# Patient Record
Sex: MALE | Race: WHITE | NOT HISPANIC OR LATINO | Employment: FULL TIME | URBAN - METROPOLITAN AREA
[De-identification: names, ages, dates, MRNs, and addresses within clinical notes are randomized per-mention and may not be internally consistent; named-entity substitution may affect disease eponyms.]

---

## 2020-04-02 ENCOUNTER — OFFICE VISIT (OUTPATIENT)
Dept: URGENT CARE | Facility: CLINIC | Age: 40
End: 2020-04-02
Payer: COMMERCIAL

## 2020-04-02 VITALS
HEART RATE: 88 BPM | HEIGHT: 60 IN | BODY MASS INDEX: 49.28 KG/M2 | OXYGEN SATURATION: 99 % | WEIGHT: 251 LBS | TEMPERATURE: 98 F | RESPIRATION RATE: 16 BRPM

## 2020-04-02 DIAGNOSIS — I10 HYPERTENSION, UNSPECIFIED TYPE: Primary | ICD-10-CM

## 2020-04-02 PROCEDURE — 99203 PR OFFICE/OUTPT VISIT, NEW, LEVL III, 30-44 MIN: ICD-10-PCS | Mod: S$GLB,,, | Performed by: FAMILY MEDICINE

## 2020-04-02 PROCEDURE — 99203 OFFICE O/P NEW LOW 30 MIN: CPT | Mod: S$GLB,,, | Performed by: FAMILY MEDICINE

## 2020-04-02 RX ORDER — LISINOPRIL 10 MG/1
10 TABLET ORAL DAILY
COMMUNITY
End: 2020-10-09 | Stop reason: SDUPTHER

## 2020-04-02 RX ORDER — LISINOPRIL 20 MG/1
20 TABLET ORAL DAILY
Qty: 90 TABLET | Refills: 0 | Status: SHIPPED | OUTPATIENT
Start: 2020-04-02 | End: 2020-10-09

## 2020-04-02 NOTE — PROGRESS NOTES
Subjective:       Patient ID: Andrew Kinney is a 40 y.o. male.    Vitals:  height is 5' (1.524 m) and weight is 113.9 kg (251 lb). His oral temperature is 97.8 °F (36.6 °C). His pulse is 88. His respiration is 16 and oxygen saturation is 99%.     Chief Complaint: Medication Refill    Pt here for med refill, Lisinopril 10mg    Medication Refill   This is a new problem. The current episode started today. Pertinent negatives include no arthralgias, chest pain, chills, congestion, coughing, fatigue, fever, headaches, joint swelling, myalgias, nausea, rash, sore throat, vertigo or vomiting. Nothing aggravates the symptoms. He has tried nothing for the symptoms. The treatment provided no relief.       Constitution: Negative for chills, fatigue and fever.   HENT: Negative for congestion and sore throat.    Neck: Negative for painful lymph nodes.   Cardiovascular: Negative for chest pain and leg swelling.   Eyes: Negative for double vision and blurred vision.   Respiratory: Negative for cough and shortness of breath.    Gastrointestinal: Negative for nausea, vomiting and diarrhea.   Genitourinary: Negative for dysuria, frequency and urgency.   Musculoskeletal: Negative for joint pain, joint swelling, muscle cramps and muscle ache.   Skin: Negative for color change, pale and rash.   Allergic/Immunologic: Negative for seasonal allergies.   Neurological: Negative for dizziness, history of vertigo, light-headedness, passing out and headaches.   Hematologic/Lymphatic: Negative for swollen lymph nodes, easy bruising/bleeding and history of blood clots. Does not bruise/bleed easily.   Psychiatric/Behavioral: Negative for nervous/anxious, sleep disturbance and depression. The patient is not nervous/anxious.        Objective:      Physical Exam      Patient was seen remotely due to State of Emergency for the COVID-19 outbreak.    Assessment:       1. Hypertension, unspecified type        Plan:     pt needs refill. States  BP is doing well.     Hypertension, unspecified type    Other orders  -     lisinopriL (PRINIVIL,ZESTRIL) 20 MG tablet; Take 1 tablet (20 mg total) by mouth once daily.  Dispense: 90 tablet; Refill: 0

## 2021-04-09 PROBLEM — I10 ESSENTIAL HYPERTENSION: Status: ACTIVE | Noted: 2021-04-09

## 2021-04-29 ENCOUNTER — PATIENT MESSAGE (OUTPATIENT)
Dept: RESEARCH | Facility: HOSPITAL | Age: 41
End: 2021-04-29

## 2023-04-21 PROBLEM — E78.5 HYPERLIPIDEMIA: Status: ACTIVE | Noted: 2023-04-21

## 2024-08-29 ENCOUNTER — OFFICE VISIT (OUTPATIENT)
Dept: GASTROENTEROLOGY | Facility: CLINIC | Age: 44
End: 2024-08-29

## 2024-08-29 VITALS — BODY MASS INDEX: 43.24 KG/M2 | HEIGHT: 65 IN | WEIGHT: 259.5 LBS

## 2024-08-29 DIAGNOSIS — R74.8 ELEVATED LIPASE: ICD-10-CM

## 2024-08-29 DIAGNOSIS — K86.89 PANCREATIC CALCIFICATION: ICD-10-CM

## 2024-08-29 DIAGNOSIS — R19.5 LOOSE STOOLS: ICD-10-CM

## 2024-08-29 DIAGNOSIS — R10.32 LLQ PAIN: Primary | ICD-10-CM

## 2024-08-29 PROCEDURE — 99213 OFFICE O/P EST LOW 20 MIN: CPT | Mod: PBBFAC,PO

## 2024-08-29 PROCEDURE — 99999 PR PBB SHADOW E&M-EST. PATIENT-LVL III: CPT | Mod: PBBFAC,,,

## 2024-08-29 NOTE — PROGRESS NOTES
"Subjective:       Patient ID: Andrew Kinney is a 44 y.o. male Body mass index is 43.18 kg/m².    Chief Complaint: Abdominal Pain    This patient is new to me.  Referring Provider: Dr. Bill Haines for LLQ pain.     Reviewed hospital ER report from 08/25/24,  Medical Decision Making:   Initial Assessment:   44-year-old non-toxic appearing male with no significant past medical history presents to the ED with complaints of left lower quadrant abdominal pain since Friday night.  He denies fever, chills, dysuria, nausea, vomiting, or diarrhea.  Last bowel movement this morning.  No relief with Tylenol last night.  Differential Diagnosis:   Includes but is not limited to diverticulitis, pancreatitis, UTI, dehydration, appendicitis, cholecystitis"    Abdominal Pain  This is a new problem. The current episode started in the past 7 days (Started Friday evening). The onset quality is gradual. The problem occurs intermittently. The problem has been gradually improving (Improved since starting antibiotics). The pain is located in the LLQ. The pain is at a severity of 3/10. The pain is mild. The quality of the pain is sharp (Tender). The abdominal pain radiates to the suprapubic region and RLQ. Associated symptoms include diarrhea (Chronic loose stools; typically has 2-3 BMs daily rated stool 5-6 on [place] scale) and weight loss (Patient was intentionally trying to lose weight). Pertinent negatives include no belching, constipation, dysuria, fever, flatus, frequency, hematochezia, hematuria, melena, nausea or vomiting. The pain is aggravated by movement and palpation. The pain is relieved by Being still. He has tried antibiotics and acetaminophen (Currently taking Augmentin; past treatments: Tylenol (ineffective)) for the symptoms. The treatment provided significant relief. Prior diagnostic workup includes GI consult and CT scan. His past medical history is significant for pancreatitis (possible episode of " pancreatitis 12 years ago after experiencing RUQ pain but did not go to ER because of insurance coverage issues; calcified focus in the mid pancreatic body measuring 1.4 cm likely from previous pancreatitis seen on CT 08/25/2024). There is no history of abdominal surgery, colon cancer, Crohn's disease, gallstones, GERD, irritable bowel syndrome, PUD or ulcerative colitis. Patient's medical history does not include kidney stones and UTI.     Review of Systems   Constitutional:  Positive for weight loss (Patient was intentionally trying to lose weight). Negative for activity change, appetite change, chills, diaphoresis, fatigue, fever and unexpected weight change.   HENT:  Negative for sore throat and trouble swallowing.    Respiratory:  Negative for cough, choking and shortness of breath.    Cardiovascular:  Negative for chest pain.   Gastrointestinal:  Positive for abdominal pain and diarrhea (Chronic loose stools; typically has 2-3 BMs daily rated stool 5-6 on [place] scale). Negative for abdominal distention, anal bleeding, blood in stool, constipation, flatus, hematochezia, melena, nausea, rectal pain and vomiting.   Genitourinary:  Negative for dysuria, frequency and hematuria.       No LMP for male patient.  Past Medical History:   Diagnosis Date    Hypertension      History reviewed. No pertinent surgical history.  Family History   Problem Relation Name Age of Onset    Stroke Mother      Stomach cancer Father      Cancer Father          lung    Lung cancer Father          mets all over body    Seizures Brother          due to Motor vehicle accident    Diabetes Maternal Grandmother      Dementia Maternal Grandmother      No Known Problems Maternal Grandfather      Alzheimer's disease Paternal Grandfather      Esophageal cancer Neg Hx      Crohn's disease Neg Hx      Ulcerative colitis Neg Hx       Social History     Tobacco Use    Smoking status: Former     Current packs/day: 0.00     Types: Cigarettes      Start date:      Quit date:      Years since quittin.6    Smokeless tobacco: Never    Tobacco comments:     quit vaping on 2020    Substance Use Topics    Alcohol use: Not Currently     Wt Readings from Last 10 Encounters:   24 117.7 kg (259 lb 7.7 oz)   24 113.7 kg (250 lb 10.6 oz)   24 122.5 kg (270 lb)   10/23/23 113.4 kg (250 lb)   23 114.5 kg (252 lb 8 oz)   23 122.7 kg (270 lb 8 oz)   23 122.7 kg (270 lb 8 oz)   23 122.7 kg (270 lb 8 oz)   10/21/22 121.8 kg (268 lb 9.6 oz)   22 124.2 kg (273 lb 12.8 oz)     Lab Results   Component Value Date    WBC 8.08 2024    HGB 16.6 2024    HCT 48.8 2024    MCV 90 2024     2024     CMP  Sodium   Date Value Ref Range Status   2024 136 136 - 145 mmol/L Final     Potassium   Date Value Ref Range Status   2024 4.4 3.5 - 5.1 mmol/L Final     Comment:     Anion Gap reference range revised on 2023     Chloride   Date Value Ref Range Status   2024 106 95 - 110 mmol/L Final     CO2   Date Value Ref Range Status   2024 19 (L) 22 - 31 mmol/L Final     Glucose   Date Value Ref Range Status   2024 133 (H) 70 - 110 mg/dL Final     Comment:     The ADA recommends the following guidelines for fasting glucose:    Normal:       less than 100 mg/dL    Prediabetes:  100 mg/dL to 125 mg/dL    Diabetes:     126 mg/dL or higher       BUN   Date Value Ref Range Status   2024 19 9 - 21 mg/dL Final     Creatinine   Date Value Ref Range Status   2024 0.80 0.50 - 1.40 mg/dL Final     Calcium   Date Value Ref Range Status   2024 9.1 8.4 - 10.2 mg/dL Final     Total Protein   Date Value Ref Range Status   2024 7.9 6.0 - 8.4 g/dL Final     Albumin   Date Value Ref Range Status   2024 4.5 3.5 - 5.2 g/dL Final     Total Bilirubin   Date Value Ref Range Status   2024 0.7 0.2 - 1.3 mg/dL Final     Alkaline Phosphatase   Date Value Ref  Range Status   08/25/2024 111 38 - 145 U/L Final     AST   Date Value Ref Range Status   08/25/2024 32 17 - 59 U/L Final     ALT   Date Value Ref Range Status   08/25/2024 46 0 - 50 U/L Final     Anion Gap   Date Value Ref Range Status   08/25/2024 11 5 - 12 mmol/L Final     Comment:     Anion Gap reference range revised on 4/28/2023     eGFR if    Date Value Ref Range Status   10/11/2021 >60 >60 mL/min/1.73 m^2 Final     eGFR if non    Date Value Ref Range Status   10/11/2021 >60 >60 mL/min/1.73 m^2 Final     Comment:     Calculation used to obtain the estimated glomerular filtration  rate (eGFR) is the CKD-EPI equation.        Lab Results   Component Value Date    LIPASERES 590 (H) 08/25/2024     Reviewed prior medical records including radiology report of CT abdomen and pelvis 08/25/2024 & endoscopy history (see surgical history).    Objective:      Physical Exam  Vitals and nursing note reviewed.   Constitutional:       General: He is not in acute distress.     Appearance: Normal appearance. He is not ill-appearing.   HENT:      Mouth/Throat:      Lips: Pink. No lesions.   Cardiovascular:      Rate and Rhythm: Normal rate and regular rhythm.      Heart sounds: Normal heart sounds.   Pulmonary:      Effort: Pulmonary effort is normal. No respiratory distress.      Breath sounds: Normal breath sounds.   Abdominal:      General: Abdomen is flat. Bowel sounds are normal. There is no distension or abdominal bruit. There are no signs of injury.      Palpations: Abdomen is soft. There is no shifting dullness, fluid wave, hepatomegaly, splenomegaly or mass.      Tenderness: There is abdominal tenderness in the left lower quadrant. There is no guarding or rebound. Negative signs include Vargas's sign, Rovsing's sign and McBurney's sign.   Skin:     General: Skin is warm and dry.      Coloration: Skin is not jaundiced or pale.   Neurological:      Mental Status: He is alert and oriented to  person, place, and time.   Psychiatric:         Attention and Perception: Attention normal.         Mood and Affect: Mood normal.         Speech: Speech normal.         Behavior: Behavior normal.         Assessment:       1. LLQ pain    2. Loose stools    3. Elevated lipase    4. Pancreatic calcification        Plan:       LLQ pain  - schedule Colonoscopy to be done in 4-6 weeks, discussed procedure with the patient, verbalized understanding  - consider Bentyl    Loose stools  - schedule Colonoscopy to be done in 4-6 weeks, discussed procedure with the patient, verbalized understanding  - Recommended increase fiber in diet, especially soluble fiber since this can help bulk up the stool consistency and may help to slow down how fast the stool goes through the colon and can prevent diarrhea  -     Giardia / Cryptosporidum, EIA; Future; Expected date: 08/29/2024  -     Stool Exam-Ova,Cysts,Parasites; Future; Expected date: 08/29/2024  -     Clostridium difficile EIA; Future; Expected date: 08/29/2024  -     Stool culture; Future; Expected date: 08/29/2024  - consider pancreatic elastase    Elevated lipase & Pancreatic calcification   - repeat lipase in about 2 weeks  - consider endoscopic ultrasound  -     Lipase; Future; Expected date: 08/29/2024  - consider EGD    Follow up in about 4 weeks (around 9/26/2024), or if symptoms worsen or fail to improve.      If no improvement in symptoms or symptoms worsen, call/follow-up at clinic or go to ER.        Total time spent on the encounter includes face to face time and non-face to face time preparing to see the patient (eg, review of tests), Obtaining and/or reviewing separately obtained history, Documenting clinical information in the electronic or other health record, Independently interpreting results (not separately reported) and communicating results to the patient/family/caregiver, or Care coordination (not separately reported).     A dictation software program was  used for this note. Please expect some simple typographical  errors in this note.

## 2024-09-13 ENCOUNTER — PATIENT MESSAGE (OUTPATIENT)
Dept: GASTROENTEROLOGY | Facility: CLINIC | Age: 44
End: 2024-09-13
Payer: COMMERCIAL

## 2024-09-16 DIAGNOSIS — R74.8 ELEVATED LIPASE: ICD-10-CM

## 2024-09-16 DIAGNOSIS — R93.5 ABNORMAL CT OF THE ABDOMEN: Primary | ICD-10-CM

## 2024-09-16 DIAGNOSIS — K86.89 PANCREATIC CALCIFICATION: ICD-10-CM

## 2024-09-17 ENCOUNTER — DOCUMENTATION ONLY (OUTPATIENT)
Dept: HEMATOLOGY/ONCOLOGY | Facility: CLINIC | Age: 44
End: 2024-09-17
Payer: COMMERCIAL

## 2024-10-23 ENCOUNTER — ANESTHESIA EVENT (OUTPATIENT)
Dept: ENDOSCOPY | Facility: HOSPITAL | Age: 44
End: 2024-10-23
Payer: COMMERCIAL

## 2024-10-24 ENCOUNTER — ANESTHESIA (OUTPATIENT)
Dept: ENDOSCOPY | Facility: HOSPITAL | Age: 44
End: 2024-10-24
Payer: COMMERCIAL

## 2024-10-24 ENCOUNTER — HOSPITAL ENCOUNTER (OUTPATIENT)
Facility: HOSPITAL | Age: 44
Discharge: HOME | End: 2024-10-24
Attending: STUDENT IN AN ORGANIZED HEALTH CARE EDUCATION/TRAINING PROGRAM | Admitting: STUDENT IN AN ORGANIZED HEALTH CARE EDUCATION/TRAINING PROGRAM
Payer: COMMERCIAL

## 2024-10-24 DIAGNOSIS — R19.5 LOOSE STOOLS: Primary | ICD-10-CM

## 2024-10-24 PROCEDURE — 88305 TISSUE EXAM BY PATHOLOGIST: CPT | Mod: 26,,, | Performed by: STUDENT IN AN ORGANIZED HEALTH CARE EDUCATION/TRAINING PROGRAM

## 2024-10-24 PROCEDURE — 37000009 HC ANESTHESIA EA ADD 15 MINS: Mod: PO | Performed by: STUDENT IN AN ORGANIZED HEALTH CARE EDUCATION/TRAINING PROGRAM

## 2024-10-24 PROCEDURE — 88305 TISSUE EXAM BY PATHOLOGIST: CPT | Mod: PO | Performed by: STUDENT IN AN ORGANIZED HEALTH CARE EDUCATION/TRAINING PROGRAM

## 2024-10-24 PROCEDURE — 45385 COLONOSCOPY W/LESION REMOVAL: CPT | Mod: PO | Performed by: STUDENT IN AN ORGANIZED HEALTH CARE EDUCATION/TRAINING PROGRAM

## 2024-10-24 PROCEDURE — 37000008 HC ANESTHESIA 1ST 15 MINUTES: Mod: PO | Performed by: STUDENT IN AN ORGANIZED HEALTH CARE EDUCATION/TRAINING PROGRAM

## 2024-10-24 PROCEDURE — 27201089 HC SNARE, DISP (ANY): Mod: PO | Performed by: STUDENT IN AN ORGANIZED HEALTH CARE EDUCATION/TRAINING PROGRAM

## 2024-10-24 PROCEDURE — 45385 COLONOSCOPY W/LESION REMOVAL: CPT | Mod: ,,, | Performed by: STUDENT IN AN ORGANIZED HEALTH CARE EDUCATION/TRAINING PROGRAM

## 2024-10-24 PROCEDURE — 63600175 PHARM REV CODE 636 W HCPCS: Mod: PO | Performed by: NURSE ANESTHETIST, CERTIFIED REGISTERED

## 2024-10-24 RX ORDER — SODIUM CHLORIDE 0.9 % (FLUSH) 0.9 %
10 SYRINGE (ML) INJECTION
Status: DISCONTINUED | OUTPATIENT
Start: 2024-10-24 | End: 2024-10-24 | Stop reason: HOSPADM

## 2024-10-24 RX ORDER — PROPOFOL 10 MG/ML
VIAL (ML) INTRAVENOUS
Status: DISCONTINUED | OUTPATIENT
Start: 2024-10-24 | End: 2024-10-24

## 2024-10-24 RX ORDER — LIDOCAINE HYDROCHLORIDE 20 MG/ML
INJECTION INTRAVENOUS
Status: DISCONTINUED | OUTPATIENT
Start: 2024-10-24 | End: 2024-10-24

## 2024-10-24 RX ORDER — SODIUM CHLORIDE, SODIUM LACTATE, POTASSIUM CHLORIDE, CALCIUM CHLORIDE 600; 310; 30; 20 MG/100ML; MG/100ML; MG/100ML; MG/100ML
INJECTION, SOLUTION INTRAVENOUS CONTINUOUS
Status: DISCONTINUED | OUTPATIENT
Start: 2024-10-24 | End: 2024-10-24 | Stop reason: HOSPADM

## 2024-10-24 RX ORDER — PROPOFOL 10 MG/ML
VIAL (ML) INTRAVENOUS CONTINUOUS PRN
Status: DISCONTINUED | OUTPATIENT
Start: 2024-10-24 | End: 2024-10-24

## 2024-10-24 RX ADMIN — PROPOFOL 120 MG: 10 INJECTION, EMULSION INTRAVENOUS at 07:10

## 2024-10-24 RX ADMIN — PROPOFOL 150 MCG/KG/MIN: 10 INJECTION, EMULSION INTRAVENOUS at 07:10

## 2024-10-24 RX ADMIN — LIDOCAINE HYDROCHLORIDE 5 MG: 20 INJECTION INTRAVENOUS at 07:10

## 2024-10-24 RX ADMIN — PROPOFOL 100 MG: 10 INJECTION, EMULSION INTRAVENOUS at 07:10

## 2024-10-24 NOTE — TRANSFER OF CARE
"Anesthesia Transfer of Care Note    Patient: Andrew Kinney    Procedure(s) Performed: Procedure(s) (LRB):  COLONOSCOPY (N/A)    Patient location: PACU    Anesthesia Type: general    Transport from OR: Transported from OR on room air with adequate spontaneous ventilation    Post pain: adequate analgesia    Post assessment: no apparent anesthetic complications and tolerated procedure well    Post vital signs: stable    Level of consciousness: lethargic and responds to stimulation    Nausea/Vomiting: no nausea/vomiting    Complications: none    Transfer of care protocol was followed      Last vitals: Visit Vitals  BP (!) 105/59 (BP Location: Right arm, Patient Position: Sitting)   Pulse 64   Temp 36.2 °C (97.2 °F) (Skin)   Resp 18   Ht 5' 5" (1.651 m)   Wt 117.5 kg (259 lb)   SpO2 95%   BMI 43.10 kg/m²     "

## 2024-10-24 NOTE — ANESTHESIA PREPROCEDURE EVALUATION
10/24/2024  Andrew Kinney is a 44 y.o., male.      Pre-op Assessment    I have reviewed the Patient Summary Reports.     I have reviewed the Nursing Notes. I have reviewed the NPO Status.   I have reviewed the Medications.     Review of Systems  Anesthesia Hx:  No problems with previous Anesthesia                Social:  Former Smoker, Vaping Quit Smokin12  Vapor smoker        Cardiovascular:     Hypertension           hyperlipidemia                         Hypertension         Pulmonary:  Pulmonary Normal                       Neurological:  Neurology Normal                                      Endocrine:        Morbid Obesity / BMI > 40      Physical Exam  General: Well nourished and Cooperative    Airway:  Mallampati: II   Mouth Opening: Normal  TM Distance: Normal  Neck ROM: Normal ROM    Dental:  Intact        Anesthesia Plan  Type of Anesthesia, risks & benefits discussed:    Anesthesia Type: Gen Natural Airway  Intra-op Monitoring Plan: Standard ASA Monitors  Post Op Pain Control Plan: IV/PO Opioids PRN  Informed Consent: Informed consent signed with the Patient and all parties understand the risks and agree with anesthesia plan.  All questions answered.   ASA Score: 3    Ready For Surgery From Anesthesia Perspective.     .

## 2024-10-24 NOTE — ANESTHESIA POSTPROCEDURE EVALUATION
Anesthesia Post Evaluation    Patient: Andrew Kinney    Procedure(s) Performed: Procedure(s) (LRB):  COLONOSCOPY (N/A)    Final Anesthesia Type: general      Patient location during evaluation: PACU  Patient participation: Yes- Able to Participate  Level of consciousness: awake  Post-procedure vital signs: reviewed and stable  Pain management: adequate  Airway patency: patent    PONV status at discharge: No PONV  Anesthetic complications: no      Cardiovascular status: blood pressure returned to baseline  Respiratory status: unassisted  Hydration status: euvolemic  Follow-up not needed.              Vitals Value Taken Time   /64 10/24/24 0840   Temp  10/24/24 0854   Pulse 80 10/24/24 0840   Resp 15 10/24/24 0840   SpO2 95 % 10/24/24 0840         Event Time   Out of Recovery 08:45:54         Pain/Juan Score: Juan Score: 10 (10/24/2024  8:40 AM)

## 2024-10-25 VITALS
OXYGEN SATURATION: 95 % | WEIGHT: 259 LBS | HEART RATE: 80 BPM | RESPIRATION RATE: 15 BRPM | DIASTOLIC BLOOD PRESSURE: 64 MMHG | HEIGHT: 65 IN | BODY MASS INDEX: 43.15 KG/M2 | TEMPERATURE: 97 F | SYSTOLIC BLOOD PRESSURE: 124 MMHG

## 2024-10-28 PROBLEM — E66.01 SEVERE OBESITY (BMI >= 40): Status: ACTIVE | Noted: 2021-04-09

## 2024-10-28 LAB
FINAL PATHOLOGIC DIAGNOSIS: NORMAL
GROSS: NORMAL
Lab: NORMAL